# Patient Record
Sex: MALE | Race: WHITE | NOT HISPANIC OR LATINO | Employment: FULL TIME | URBAN - NONMETROPOLITAN AREA
[De-identification: names, ages, dates, MRNs, and addresses within clinical notes are randomized per-mention and may not be internally consistent; named-entity substitution may affect disease eponyms.]

---
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
## 2019-10-30 ENCOUNTER — HOSPITAL ENCOUNTER (EMERGENCY)
Facility: HOSPITAL | Age: 19
Discharge: HOME | End: 2019-10-30
Attending: FAMILY MEDICINE | Admitting: FAMILY MEDICINE

## 2019-10-30 VITALS
DIASTOLIC BLOOD PRESSURE: 83 MMHG | TEMPERATURE: 98.3 F | RESPIRATION RATE: 16 BRPM | BODY MASS INDEX: 25.01 KG/M2 | SYSTOLIC BLOOD PRESSURE: 139 MMHG | WEIGHT: 146.5 LBS | HEIGHT: 64 IN | OXYGEN SATURATION: 99 % | HEART RATE: 83 BPM

## 2019-10-30 DIAGNOSIS — J02.0 PHARYNGITIS DUE TO GROUP A BETA HEMOLYTIC STREPTOCOCCI: Primary | ICD-10-CM

## 2019-10-30 LAB — S PYO AG THROAT QL: POSITIVE

## 2019-10-30 PROCEDURE — 99283 EMERGENCY DEPT VISIT LOW MDM: CPT

## 2019-10-30 PROCEDURE — 87880 STREP A ASSAY W/OPTIC: CPT

## 2019-10-30 RX ORDER — AMOXICILLIN 500 MG/1
500 CAPSULE ORAL 2 TIMES DAILY
Qty: 20 CAPSULE | Refills: 0 | Status: SHIPPED | OUTPATIENT
Start: 2019-10-30 | End: 2019-11-09

## 2020-02-25 ENCOUNTER — APPOINTMENT (OUTPATIENT)
Dept: GENERAL RADIOLOGY | Facility: HOSPITAL | Age: 20
End: 2020-02-25

## 2020-02-25 ENCOUNTER — HOSPITAL ENCOUNTER (EMERGENCY)
Facility: HOSPITAL | Age: 20
Discharge: HOME | End: 2020-02-25
Attending: EMERGENCY MEDICINE | Admitting: EMERGENCY MEDICINE

## 2020-02-25 VITALS
HEART RATE: 84 BPM | OXYGEN SATURATION: 98 % | DIASTOLIC BLOOD PRESSURE: 64 MMHG | RESPIRATION RATE: 15 BRPM | TEMPERATURE: 97.8 F | HEIGHT: 63 IN | WEIGHT: 145 LBS | SYSTOLIC BLOOD PRESSURE: 119 MMHG | BODY MASS INDEX: 25.69 KG/M2

## 2020-02-25 DIAGNOSIS — R07.9 CHEST PAIN, UNSPECIFIED TYPE: Primary | ICD-10-CM

## 2020-02-25 LAB
ALBUMIN SERPL-MCNC: 4.5 G/DL (ref 3.5–5.2)
ALBUMIN/GLOB SERPL: 1.8 G/DL
ALP SERPL-CCNC: 71 U/L (ref 39–117)
ALT SERPL W P-5'-P-CCNC: 14 U/L (ref 1–41)
AMPHET+METHAMPHET UR QL: NEGATIVE
AMPHETAMINES UR QL: NEGATIVE
ANION GAP SERPL CALCULATED.3IONS-SCNC: 13 MMOL/L (ref 5–15)
AST SERPL-CCNC: 19 U/L (ref 1–40)
BARBITURATES UR QL SCN: NEGATIVE
BASOPHILS # BLD AUTO: 0.05 10*3/MM3 (ref 0–0.2)
BASOPHILS NFR BLD AUTO: 0.6 % (ref 0–1.5)
BENZODIAZ UR QL SCN: NEGATIVE
BILIRUB SERPL-MCNC: 0.7 MG/DL (ref 0.2–1.2)
BILIRUB UR QL STRIP: NEGATIVE
BUN BLD-MCNC: 13 MG/DL (ref 6–20)
BUN/CREAT SERPL: 14.3 (ref 7–25)
BUPRENORPHINE SERPL-MCNC: NEGATIVE NG/ML
CALCIUM SPEC-SCNC: 9.4 MG/DL (ref 8.6–10.5)
CANNABINOIDS SERPL QL: NEGATIVE
CHLORIDE SERPL-SCNC: 102 MMOL/L (ref 98–107)
CLARITY UR: ABNORMAL
CO2 SERPL-SCNC: 25 MMOL/L (ref 22–29)
COCAINE UR QL: NEGATIVE
COLOR UR: YELLOW
CREAT BLD-MCNC: 0.91 MG/DL (ref 0.76–1.27)
D-DIMER, QUANTITATIVE (MAD,POW, STR): 276 NG/ML (FEU) (ref 0–470)
DEPRECATED RDW RBC AUTO: 37.1 FL (ref 37–54)
EOSINOPHIL # BLD AUTO: 0.27 10*3/MM3 (ref 0–0.4)
EOSINOPHIL NFR BLD AUTO: 3.5 % (ref 0.3–6.2)
ERYTHROCYTE [DISTWIDTH] IN BLOOD BY AUTOMATED COUNT: 12.1 % (ref 12.3–15.4)
GFR SERPL CREATININE-BSD FRML MDRD: 106 ML/MIN/1.73
GLOBULIN UR ELPH-MCNC: 2.5 GM/DL
GLUCOSE BLD-MCNC: 122 MG/DL (ref 65–99)
GLUCOSE UR STRIP-MCNC: NEGATIVE MG/DL
HCT VFR BLD AUTO: 44.8 % (ref 37.5–51)
HGB BLD-MCNC: 16 G/DL (ref 13–17.7)
HGB UR QL STRIP.AUTO: NEGATIVE
HOLD SPECIMEN: NORMAL
HOLD SPECIMEN: NORMAL
IMM GRANULOCYTES # BLD AUTO: 0.02 10*3/MM3 (ref 0–0.05)
IMM GRANULOCYTES NFR BLD AUTO: 0.3 % (ref 0–0.5)
KETONES UR QL STRIP: NEGATIVE
LEUKOCYTE ESTERASE UR QL STRIP.AUTO: NEGATIVE
LYMPHOCYTES # BLD AUTO: 1.91 10*3/MM3 (ref 0.7–3.1)
LYMPHOCYTES NFR BLD AUTO: 24.7 % (ref 19.6–45.3)
MCH RBC QN AUTO: 30.4 PG (ref 26.6–33)
MCHC RBC AUTO-ENTMCNC: 35.7 G/DL (ref 31.5–35.7)
MCV RBC AUTO: 85 FL (ref 79–97)
METHADONE UR QL SCN: NEGATIVE
MONOCYTES # BLD AUTO: 0.71 10*3/MM3 (ref 0.1–0.9)
MONOCYTES NFR BLD AUTO: 9.2 % (ref 5–12)
NEUTROPHILS # BLD AUTO: 4.78 10*3/MM3 (ref 1.7–7)
NEUTROPHILS NFR BLD AUTO: 61.7 % (ref 42.7–76)
NITRITE UR QL STRIP: NEGATIVE
NRBC BLD AUTO-RTO: 0 /100 WBC (ref 0–0.2)
NT-PROBNP SERPL-MCNC: 12 PG/ML (ref 5–450)
OPIATES UR QL: NEGATIVE
OXYCODONE UR QL SCN: NEGATIVE
PCP UR QL SCN: NEGATIVE
PH UR STRIP.AUTO: 7.5 [PH] (ref 5–9)
PLATELET # BLD AUTO: 241 10*3/MM3 (ref 140–450)
PMV BLD AUTO: 8.4 FL (ref 6–12)
POTASSIUM BLD-SCNC: 3.5 MMOL/L (ref 3.5–5.2)
PROPOXYPH UR QL: NEGATIVE
PROT SERPL-MCNC: 7 G/DL (ref 6–8.5)
PROT UR QL STRIP: NEGATIVE
RBC # BLD AUTO: 5.27 10*6/MM3 (ref 4.14–5.8)
SODIUM BLD-SCNC: 140 MMOL/L (ref 136–145)
SP GR UR STRIP: 1.02 (ref 1–1.03)
TRICYCLICS UR QL SCN: NEGATIVE
TROPONIN T SERPL-MCNC: <0.01 NG/ML (ref 0–0.03)
TROPONIN T SERPL-MCNC: <0.01 NG/ML (ref 0–0.03)
UROBILINOGEN UR QL STRIP: ABNORMAL
WBC NRBC COR # BLD: 7.74 10*3/MM3 (ref 3.4–10.8)
WHOLE BLOOD HOLD SPECIMEN: NORMAL
WHOLE BLOOD HOLD SPECIMEN: NORMAL

## 2020-02-25 PROCEDURE — 93010 ELECTROCARDIOGRAM REPORT: CPT | Performed by: INTERNAL MEDICINE

## 2020-02-25 PROCEDURE — 85025 COMPLETE CBC W/AUTO DIFF WBC: CPT

## 2020-02-25 PROCEDURE — 84484 ASSAY OF TROPONIN QUANT: CPT | Performed by: EMERGENCY MEDICINE

## 2020-02-25 PROCEDURE — 81003 URINALYSIS AUTO W/O SCOPE: CPT | Performed by: EMERGENCY MEDICINE

## 2020-02-25 PROCEDURE — 80306 DRUG TEST PRSMV INSTRMNT: CPT | Performed by: EMERGENCY MEDICINE

## 2020-02-25 PROCEDURE — 84484 ASSAY OF TROPONIN QUANT: CPT

## 2020-02-25 PROCEDURE — 93005 ELECTROCARDIOGRAM TRACING: CPT

## 2020-02-25 PROCEDURE — 99284 EMERGENCY DEPT VISIT MOD MDM: CPT

## 2020-02-25 PROCEDURE — 80053 COMPREHEN METABOLIC PANEL: CPT

## 2020-02-25 PROCEDURE — 71045 X-RAY EXAM CHEST 1 VIEW: CPT

## 2020-02-25 PROCEDURE — 83880 ASSAY OF NATRIURETIC PEPTIDE: CPT

## 2020-02-25 PROCEDURE — 93005 ELECTROCARDIOGRAM TRACING: CPT | Performed by: EMERGENCY MEDICINE

## 2020-02-25 PROCEDURE — 85379 FIBRIN DEGRADATION QUANT: CPT | Performed by: EMERGENCY MEDICINE

## 2020-02-25 PROCEDURE — 36415 COLL VENOUS BLD VENIPUNCTURE: CPT

## 2020-02-25 RX ORDER — SODIUM CHLORIDE 0.9 % (FLUSH) 0.9 %
10 SYRINGE (ML) INJECTION AS NEEDED
Status: DISCONTINUED | OUTPATIENT
Start: 2020-02-25 | End: 2020-02-26 | Stop reason: HOSPADM

## 2020-02-25 RX ADMIN — SODIUM CHLORIDE 1000 ML: 900 INJECTION, SOLUTION INTRAVENOUS at 22:13

## 2020-02-26 NOTE — ED TRIAGE NOTES
"Pt. Presents to ED with chest pain and \"hypertension\". Pt. States 10 minutes prior to ambulance arriving he started experiencing chest pain and dizziness. Pt. Is brought to ED by [facility]. AAOX4. Airway patent. Breathing adequate and non-labored No active bleeding.   "

## 2020-02-26 NOTE — ED PROVIDER NOTES
Subjective   19-year-old white male presents to the emergency department with chief complaint of chest pain.  Patient complains of left upper chest pain that started this evening at rest.  He relates the pain does not radiate.  Nothing makes the pain better or worse. Patient denies shortness of breath, nausea, vomiting, or diaphoresis.  Patient's only known cardiac risk factors are smoking and family history of heart disease.          Review of Systems   Constitutional: Negative for chills, diaphoresis and fever.   Respiratory: Negative for shortness of breath.    Cardiovascular: Positive for chest pain. Negative for leg swelling.   Gastrointestinal: Negative for abdominal pain, nausea and vomiting.   Genitourinary: Negative for dysuria.   Musculoskeletal: Positive for back pain. Negative for neck pain and neck stiffness.   Neurological: Negative for syncope, weakness and headaches.   All other systems reviewed and are negative.      Past Medical History:   Diagnosis Date   • Myocardial infarction (CMS/HCC)    • Pleurisy        No Known Allergies    History reviewed. No pertinent surgical history.    History reviewed. No pertinent family history.    Social History     Socioeconomic History   • Marital status: Legally      Spouse name: Not on file   • Number of children: Not on file   • Years of education: Not on file   • Highest education level: Not on file   Tobacco Use   • Smoking status: Current Every Day Smoker     Packs/day: 2.50     Years: 5.00     Pack years: 12.50     Types: Cigarettes   • Smokeless tobacco: Never Used   • Tobacco comment: 4 packs a day   Substance and Sexual Activity   • Alcohol use: No   • Drug use: No   • Sexual activity: Never           Objective   Physical Exam   Constitutional: He is oriented to person, place, and time. He appears well-developed and well-nourished. No distress.   HENT:   Head: Normocephalic and atraumatic.   Right Ear: External ear normal.   Left Ear: External  ear normal.   Nose: Nose normal.   Mouth/Throat: Oropharynx is clear and moist.   Eyes: Pupils are equal, round, and reactive to light. Conjunctivae and EOM are normal.   Neck: Normal range of motion. Neck supple.   Cardiovascular: Normal rate, regular rhythm, normal heart sounds and intact distal pulses.   Pulmonary/Chest: Effort normal and breath sounds normal.   Abdominal: Soft. Bowel sounds are normal. He exhibits no distension and no mass. There is no tenderness. There is no guarding.   Musculoskeletal: Normal range of motion. He exhibits no edema or tenderness.   Neurological: He is alert and oriented to person, place, and time. No cranial nerve deficit or sensory deficit. He exhibits normal muscle tone. Coordination normal.   Skin: Skin is warm and dry. He is not diaphoretic.   Psychiatric: He has a normal mood and affect. His behavior is normal.   Nursing note and vitals reviewed.      ECG 12 Lead    Date/Time: 2/25/2020 8:04 PM  Performed by: Ricky Farrell MD  Authorized by: Ricky Farrell MD   Interpreted by physician  Rhythm: sinus tachycardia  Rate: tachycardic  BPM: 114  QRS axis: normal  Conduction: conduction normal  ST Segments: ST segments normal  T Waves: T waves normal  Clinical impression: normal ECG                 ED Course  ED Course as of Feb 25 2349   Tue Feb 25, 2020 2347 Patient is alert and resting comfortably. I reviewed the results of the emergency department evaluation with the patient.  I recommended primary care follow-up.  I advised the patient to return to the emergency department if their symptoms change or worsen.     [DR]      ED Course User Index  [DR] Ricky Farrell MD           Labs Reviewed   COMPREHENSIVE METABOLIC PANEL - Abnormal; Notable for the following components:       Result Value    Glucose 122 (*)     All other components within normal limits    Narrative:     GFR Normal >60  Chronic Kidney Disease <60  Kidney Failure <15     CBC WITH AUTO DIFFERENTIAL - Abnormal;  Notable for the following components:    RDW 12.1 (*)     All other components within normal limits   URINALYSIS W/ MICROSCOPIC IF INDICATED (NO CULTURE) - Abnormal; Notable for the following components:    Appearance, UA Cloudy (*)     All other components within normal limits    Narrative:     Urine microscopic not indicated.   TROPONIN (IN-HOUSE) - Normal    Narrative:     Troponin T Reference Range:  <= 0.03 ng/mL-   Negative for AMI  >0.03 ng/mL-     Abnormal for myocardial necrosis.  Clinicians would have to utilize clinical acumen, EKG, Troponin and serial changes to determine if it is an Acute Myocardial Infarction or myocardial injury due to an underlying chronic condition.       Results may be falsely decreased if patient taking Biotin.     TROPONIN (IN-HOUSE) - Normal    Narrative:     Troponin T Reference Range:  <= 0.03 ng/mL-   Negative for AMI  >0.03 ng/mL-     Abnormal for myocardial necrosis.  Clinicians would have to utilize clinical acumen, EKG, Troponin and serial changes to determine if it is an Acute Myocardial Infarction or myocardial injury due to an underlying chronic condition.       Results may be falsely decreased if patient taking Biotin.     BNP (IN-HOUSE) - Normal    Narrative:     Among patients with dyspnea, NT-proBNP is highly sensitive for the detection of acute congestive heart failure. In addition NT-proBNP of <300 pg/ml effectively rules out acute congestive heart failure with 99% negative predictive value.    Results may be falsely decreased if patient taking Biotin.     URINE DRUG SCREEN - Normal    Narrative:     Cutoff For Drugs Screened:    Amphetamines               500 ng/ml  Barbiturates               200 ng/ml  Benzodiazepines            150 ng/ml  Cocaine                    150 ng/ml  Methadone                  200 ng/ml  Opiates                    100 ng/ml  Phencyclidine               25 ng/ml  THC                            50 ng/ml  Methamphetamine            500  ng/ml  Tricyclic Antidepressants  300 ng/ml  Oxycodone                  100 ng/ml  Propoxyphene               300 ng/ml  Buprenorphine               10 ng/ml    The normal value for all drugs tested is negative. This report includes unconfirmed screening results, with the cutoff values listed, to be used for medical treatment purposes only.  Unconfirmed results must not be used for non-medical purposes such as employment or legal testing.  Clinical consideration should be applied to any drug of abuse test, particularly when unconfirmed results are used.     D-DIMER, QUANTITATIVE - Normal    Narrative:     Dimer values <500 ng/ml FEU are FDA approved as aid in diagnosis of deep venous thrombosis and pulmonary embolism.  This test should not be used in an exclusion strategy with pretest probability alone.    A recent guideline regarding diagnosis for pulmonary thromboembolism recommends an adjusted exclusion criterion of age x 10 ng/ml FEU for patients >50 years of age ([employer] Intern Med 2015; 163: 701-711).     RAINBOW DRAW    Narrative:     The following orders were created for panel order [place] Draw.  Procedure                               Abnormality         Status                     ---------                               -----------         ------                     Light Blue Top[[id number]]                                   Final result               Green Top (Gel)[[id number]]                                  Final result               Lavender Top[[id number]]                                     Final result               Gold Top - SST[[id number]]                                   Final result                 Please view results for these tests on the individual orders.   CBC AND DIFFERENTIAL    Narrative:     The following orders were created for panel order CBC & Differential.  Procedure                               Abnormality         Status                     ---------                                -----------         ------                     CBC Auto Differential[[id number]]        Abnormal            Final result                 Please view results for these tests on the individual orders.   LIGHT BLUE TOP   GREEN TOP   LAVENDER TOP   GOLD TOP - SST     Xr Chest 1 View    Result Date: 2/25/2020  Narrative: PROCEDURE: XR CHEST 1 VW VIEWS:Single INDICATION: Chest pain COMPARISON: CXR: 10/7/2018 FINDINGS:   - lines/tubes: None   - cardiac: Size within normal limits.   - mediastinum: Contour within normal limits.   - lungs: No evidence of a focal air space process, pulmonary interstitial edema, nodule(s)/mass.   - pleura: No evidence of  fluid.    - osseous: Unremarkable for age.     Impression: No acute cardiopulmonary process identified Electronically signed by:  Melissa Houston MD  2/25/2020 8:43 PM CST Workstation: [id number]                                HEART Score (for prediction of 6-week risk of major adverse cardiac event) reviewed and/or performed as part of the patient evaluation and treatment planning process.  The result associated with this review/performance is: 1       MDM    Final diagnoses:   Chest pain, unspecified type            Ricky Farrell MD  02/25/20 0070

## 2021-03-04 ENCOUNTER — HOSPITAL ENCOUNTER (EMERGENCY)
Facility: HOSPITAL | Age: 21
Discharge: HOME | End: 2021-03-04
Attending: EMERGENCY MEDICINE | Admitting: EMERGENCY MEDICINE

## 2021-03-04 VITALS
HEIGHT: 63 IN | SYSTOLIC BLOOD PRESSURE: 122 MMHG | RESPIRATION RATE: 18 BRPM | BODY MASS INDEX: 24.8 KG/M2 | TEMPERATURE: 98.9 F | DIASTOLIC BLOOD PRESSURE: 82 MMHG | HEART RATE: 86 BPM | WEIGHT: 140 LBS | OXYGEN SATURATION: 98 %

## 2021-03-04 DIAGNOSIS — K08.89 PAIN, DENTAL: Primary | ICD-10-CM

## 2021-03-04 PROCEDURE — 99282 EMERGENCY DEPT VISIT SF MDM: CPT

## 2021-03-04 RX ORDER — PENICILLIN V POTASSIUM 500 MG/1
500 TABLET ORAL 4 TIMES DAILY
Qty: 28 TABLET | Refills: 0 | Status: SHIPPED | OUTPATIENT
Start: 2021-03-04 | End: 2021-03-11

## 2021-03-04 RX ORDER — NAPROXEN 500 MG/1
500 TABLET ORAL 2 TIMES DAILY WITH MEALS
Qty: 14 TABLET | Refills: 0 | Status: SHIPPED | OUTPATIENT
Start: 2021-03-04 | End: 2021-03-11

## 2021-03-05 NOTE — ED PROVIDER NOTES
Subjective   Patient presents to emergency department for left upper dental pain x 3 days.  Denies fever/chills, visual changes, difficulty swallowing, sore throat.  States he has not called a dentist due to not having insurance.  States pain is worse with eating/drinking.        History provided by:  Patient   used: Yes    Dental Pain  Location:  Upper (left)  Onset quality:  Sudden  Duration:  3 days  Timing:  Constant  Associated symptoms: facial swelling    Associated symptoms: no fever    Oral Swelling  Associated symptoms: no abdominal pain, no chest pain, no cough, no fever, no nausea, no shortness of breath, no sore throat, no vomiting and no wheezing        Review of Systems   Constitutional: Negative for chills and fever.   HENT: Positive for dental problem and facial swelling. Negative for sore throat and trouble swallowing.    Respiratory: Negative for cough, shortness of breath and wheezing.    Cardiovascular: Negative for chest pain.   Gastrointestinal: Negative for abdominal pain, nausea and vomiting.   Genitourinary: Negative for dysuria and flank pain.   Skin: Negative for color change.   Allergic/Immunologic: Negative for immunocompromised state.   Neurological: Negative for syncope and weakness.   Hematological: Does not bruise/bleed easily.   Psychiatric/Behavioral: Negative for confusion.       Past Medical History:   Diagnosis Date   • Myocardial infarction (CMS/HCC)    • Pleurisy        No Known Allergies    History reviewed. No pertinent surgical history.    History reviewed. No pertinent family history.    Social History     Socioeconomic History   • Marital status:      Spouse name: Not on file   • Number of children: Not on file   • Years of education: Not on file   • Highest education level: Not on file   Tobacco Use   • Smoking status: Current Every Day Smoker     Packs/day: 2.50     Years: 5.00     Pack years: 12.50     Types: Cigarettes   • Smokeless  "tobacco: Never Used   • Tobacco comment: 4 packs a day   Substance and Sexual Activity   • Alcohol use: No   • Drug use: No   • Sexual activity: Never           Objective      /82 (BP Location: Right arm, Patient Position: Sitting)   Pulse 86   Temp 98.9 °F (37.2 °C)   Resp 18   Ht 160 cm (63\")   Wt 63.5 kg (140 lb)   SpO2 98%   BMI 24.80 kg/m²     Physical Exam  Vitals signs and nursing note reviewed.   Constitutional:       Appearance: Normal appearance.   HENT:      Head: Normocephalic and atraumatic.      Comments: Subtle left maxillary swelling     Mouth/Throat:      Mouth: Mucous membranes are moist.      Dentition: Dental caries present. No dental abscesses.   Eyes:      Conjunctiva/sclera: Conjunctivae normal.   Cardiovascular:      Rate and Rhythm: Normal rate and regular rhythm.      Pulses: Normal pulses.      Heart sounds: Normal heart sounds.   Pulmonary:      Effort: Pulmonary effort is normal. No respiratory distress.      Breath sounds: Normal breath sounds. No wheezing.   Skin:     General: Skin is warm.      Capillary Refill: Capillary refill takes less than 2 seconds.   Neurological:      General: No focal deficit present.      Mental Status: He is alert.   Psychiatric:         Mood and Affect: Mood normal.         Behavior: Behavior normal.         Thought Content: Thought content normal.         Procedures           ED Course      Discussed results with patient.  Gave educational materials.  Advised close follow up with Dentist.  Return to emergency department for new or worsening symptoms.                                         MDM    Final diagnoses:   Pain, dental            Roge Jerome PA-C  03/04/21 2039    "